# Patient Record
Sex: MALE
[De-identification: names, ages, dates, MRNs, and addresses within clinical notes are randomized per-mention and may not be internally consistent; named-entity substitution may affect disease eponyms.]

---

## 2019-11-06 ENCOUNTER — APPOINTMENT (OUTPATIENT)
Dept: HEART AND VASCULAR | Facility: CLINIC | Age: 59
End: 2019-11-06
Payer: MEDICARE

## 2019-11-06 ENCOUNTER — TRANSCRIPTION ENCOUNTER (OUTPATIENT)
Age: 59
End: 2019-11-06

## 2019-11-06 ENCOUNTER — NON-APPOINTMENT (OUTPATIENT)
Age: 59
End: 2019-11-06

## 2019-11-06 VITALS
DIASTOLIC BLOOD PRESSURE: 104 MMHG | HEIGHT: 70 IN | WEIGHT: 197 LBS | HEART RATE: 82 BPM | SYSTOLIC BLOOD PRESSURE: 154 MMHG | OXYGEN SATURATION: 98 % | BODY MASS INDEX: 28.2 KG/M2

## 2019-11-06 VITALS — HEART RATE: 80 BPM | DIASTOLIC BLOOD PRESSURE: 90 MMHG | SYSTOLIC BLOOD PRESSURE: 140 MMHG

## 2019-11-06 PROBLEM — Z00.00 ENCOUNTER FOR PREVENTIVE HEALTH EXAMINATION: Status: ACTIVE | Noted: 2019-11-06

## 2019-11-06 PROCEDURE — 99214 OFFICE O/P EST MOD 30 MIN: CPT | Mod: 25

## 2019-11-06 PROCEDURE — 93000 ELECTROCARDIOGRAM COMPLETE: CPT

## 2019-11-06 NOTE — HISTORY OF PRESENT ILLNESS
[FreeTextEntry1] : Mr. Jones presents for follow up and management of CAD s/p CABG x 3 (08/01/14). He was referred to my practice for an exercise stress echocardiogram on 07/07/14 as part of his pre-operative evaluation for knee surgery when it was discovered he had an abnormal ECG. He underwent an exercise stress echocardiogram as part of the preoperative work up which revealed an EF of 49%, an old inferolateral MI and inducible inferior ischemia. We discussed having a cardiac evaluation at that time given this new and unexpected finding but he had to travel back to his home country, [place]. In [place], he underwent an ECG only stress test which was interpreted as being normal but he shared the results of my testing with the [place] doctors and he then went on to have an invasive coronary angiogram which revealed total occlusion of his RCA with 3 vessel CAD. He had CABG on 08/01/14 and has recovered well.  He had an exercise stress echocardiogram on 03/09/17 which revealed an EF of 50%, basal inferior/inferolateral akinesis at rest, and no inducible ischemia after 14.8 METS of exercise. He had an exercise stress echocardiogram today 09/04/18 which revealed 09/04/18 an EF of 45%, basal inferior/inferolateral and apical lateral akinesis at rest, no inducible ischemia, 14.1 METS.   He currently lives in [place], [place].  He admits to being off amlodipine for several weeks and to being relatively sedentary but is otherwise well.

## 2019-11-06 NOTE — ASSESSMENT
[FreeTextEntry1] : 1. CAD: s/p silent inferolateral MI, s/p CABG x 3 08/01/14 in [place], CCS 1, s/p 09/04/18: exercise stress echo: EF 45%, basal inferior/inferolateral and apical lateral akinesis at rest, no inducible ischemia, 14.1 METS\par             - continue aggressive medical management \par             - continue ASA 81mg po QD\par             - will send for an exercise stress echocardiogram to r/o inducible ischemia\par  \par 2. S/P CABG (coronary artery bypass graft): CAD, s/p silent inferolateral MI, s/p CABG x 3 08/01/14 in [place] \par  \par 3. Carotid stenosis, bilateral: 11/16/15: sono: b/l 1-19% prox ICA, LECA <50%. \par             - continue medical management\par \par 4. HTN: BP not at ACC/AHA 2017 guideline target\par             - re-initiate amlodipine 10mg po daily\par \par 5. Hyperlipidemia:\par             - continue Crestor 40mg po daily\par             - check lab work today

## 2019-11-06 NOTE — REASON FOR VISIT
[FreeTextEntry1] : Diagnostic Tests\par ----------------------------------\par ECG:\par 11/06/19: NSR, old inferior MI, incomplete RBBB. \par 09/04/18: NSR, possible LATISHA, inferior-posterior MI. \par 03/09/17: NSR, possible inferior MI. \par 11/11/14: NSR, lateral MI\par ------------------------------------\par Stress:\par 09/04/18: exercise stress echo: EF 45%, basal inferior/inferolateral akinesis at rest, no inducible ichemia, 14.1METS\par 03/09/17: exercise stress echo: EF 50%, basal inferior/inferolateral akinesis at rest, no inducible ichemia, 14.8 METS\par 11/16/15: exercise stress echo: EF 50%, basal inferior/inferolateral akinesis at rest, no inducible ichemia, 14. 8 METS.\par 07/07/14: exercise stress echo: EF 49%, lateral MI, inferior ischemia\par ------------------------------------\par Cath:\par 07/30/14: in [place] , report not available, TVD \par -------------------------------------\par Echo:\par 03/06/15: EF 50%, akinesis basal and mid inferolateral walls\par -------------------------------------\par Carotid:\par 11/16/15: sono: b/l 1-19% prox ICA, LECA <50%.

## 2019-11-07 ENCOUNTER — APPOINTMENT (OUTPATIENT)
Dept: HEART AND VASCULAR | Facility: CLINIC | Age: 59
End: 2019-11-07
Payer: MEDICARE

## 2019-11-07 VITALS — HEART RATE: 83 BPM | DIASTOLIC BLOOD PRESSURE: 99 MMHG | SYSTOLIC BLOOD PRESSURE: 161 MMHG

## 2019-11-07 LAB
ALBUMIN SERPL ELPH-MCNC: 4.7 G/DL
ALP BLD-CCNC: 84 U/L
ALT SERPL-CCNC: 35 U/L
ANION GAP SERPL CALC-SCNC: 12 MMOL/L
AST SERPL-CCNC: 23 U/L
BASOPHILS # BLD AUTO: 0.02 K/UL
BASOPHILS NFR BLD AUTO: 0.4 %
BILIRUB SERPL-MCNC: 0.8 MG/DL
BUN SERPL-MCNC: 20 MG/DL
CALCIUM SERPL-MCNC: 9.4 MG/DL
CHLORIDE SERPL-SCNC: 105 MMOL/L
CHOLEST SERPL-MCNC: 282 MG/DL
CHOLEST/HDLC SERPL: 5.4 RATIO
CO2 SERPL-SCNC: 24 MMOL/L
CREAT SERPL-MCNC: 1 MG/DL
EOSINOPHIL # BLD AUTO: 0.06 K/UL
EOSINOPHIL NFR BLD AUTO: 1.1 %
ESTIMATED AVERAGE GLUCOSE: 103 MG/DL
GLUCOSE SERPL-MCNC: 92 MG/DL
HBA1C MFR BLD HPLC: 5.2 %
HCT VFR BLD CALC: 47.4 %
HDLC SERPL-MCNC: 52 MG/DL
HGB BLD-MCNC: 15.9 G/DL
IMM GRANULOCYTES NFR BLD AUTO: 0.4 %
LDLC SERPL CALC-MCNC: 174 MG/DL
LDLC SERPL DIRECT ASSAY-MCNC: 149 MG/DL
LYMPHOCYTES # BLD AUTO: 2.39 K/UL
LYMPHOCYTES NFR BLD AUTO: 44.8 %
MAN DIFF?: NORMAL
MCHC RBC-ENTMCNC: 29.9 PG
MCHC RBC-ENTMCNC: 33.5 GM/DL
MCV RBC AUTO: 89.3 FL
MONOCYTES # BLD AUTO: 0.44 K/UL
MONOCYTES NFR BLD AUTO: 8.2 %
NEUTROPHILS # BLD AUTO: 2.41 K/UL
NEUTROPHILS NFR BLD AUTO: 45.1 %
PLATELET # BLD AUTO: 186 K/UL
POTASSIUM SERPL-SCNC: 4.2 MMOL/L
PROT SERPL-MCNC: 7.7 G/DL
RBC # BLD: 5.31 M/UL
RBC # FLD: 13.5 %
SODIUM SERPL-SCNC: 141 MMOL/L
TRIGL SERPL-MCNC: 281 MG/DL
TSH SERPL-ACNC: 2.09 UIU/ML
WBC # FLD AUTO: 5.34 K/UL

## 2019-11-07 PROCEDURE — 93351 STRESS TTE COMPLETE: CPT

## 2020-11-05 ENCOUNTER — APPOINTMENT (OUTPATIENT)
Dept: HEART AND VASCULAR | Facility: CLINIC | Age: 60
End: 2020-11-05
Payer: MEDICARE

## 2020-11-05 ENCOUNTER — NON-APPOINTMENT (OUTPATIENT)
Age: 60
End: 2020-11-05

## 2020-11-05 VITALS
WEIGHT: 154 LBS | DIASTOLIC BLOOD PRESSURE: 81 MMHG | BODY MASS INDEX: 22.05 KG/M2 | HEART RATE: 99 BPM | HEIGHT: 70 IN | SYSTOLIC BLOOD PRESSURE: 144 MMHG | TEMPERATURE: 98.1 F | OXYGEN SATURATION: 98 %

## 2020-11-05 VITALS — DIASTOLIC BLOOD PRESSURE: 75 MMHG | SYSTOLIC BLOOD PRESSURE: 122 MMHG

## 2020-11-05 PROCEDURE — 93351 STRESS TTE COMPLETE: CPT | Mod: 59

## 2020-11-05 PROCEDURE — 93000 ELECTROCARDIOGRAM COMPLETE: CPT | Mod: 59

## 2020-11-05 PROCEDURE — 99214 OFFICE O/P EST MOD 30 MIN: CPT | Mod: 25

## 2020-11-05 NOTE — REASON FOR VISIT
[FreeTextEntry1] : Diagnostic Tests\par ----------------------------------\par ECG:\par 11/05/20: NSR, old inferior MI, incomplete RBBB. \par 11/06/19: NSR, old inferior MI, incomplete RBBB. \par 09/04/18: NSR, possible LATISHA, inferior-posterior MI. \par 03/09/17: NSR, possible inferior MI. \par 11/11/14: NSR, lateral MI\par ------------------------------------\par Stress:\par 11/05/20: exercise stress echo: EF 48%, aortic sclerosis, basal and mid inferolateral akinesis at rest, no inducible ischemia, 12.8 METS\par 11/07/19: exercise stress echo: EF 49%, basal inferior/inferolateral akinesis at rest, no inducible ischemia, 12.8 METS\par 09/04/18: exercise stress echo: EF 45%, basal inferior/inferolateral akinesis at rest, no inducible ischemia, 14.1 METS\par 03/09/17: exercise stress echo: EF 50%, basal inferior/inferolateral akinesis at rest, no inducible ischemia, 14.8 METS\par 11/16/15: exercise stress echo: EF 50%, basal inferior/inferolateral akinesis at rest, no inducible ischemia, 14. 8 METS.\par 07/07/14: exercise stress echo: EF 49%, lateral MI, inferior ischemia\par ------------------------------------\par Cath:\par 07/30/14: in [place] , report not available, TVD \par -------------------------------------\par Echo:\par 03/06/15: EF 50%, akinesis basal and mid inferolateral walls\par -------------------------------------\par Carotid:\par 11/16/15: sono: b/l 1-19% prox ICA, LECA <50%.

## 2020-11-05 NOTE — ASSESSMENT
[FreeTextEntry1] : 1. CAD: s/p silent inferolateral MI, s/p CABG x 3 08/01/14 in [place], CCS 1, s/p 11/05/20: exercise stress echo: EF 48%, basal inferior/inferolateral and apical lateral akinesis at rest, no inducible ischemia, 14.1 METS\par             - continue aggressive medical management \par             - restart ASA 81mg po QD (discussed with patient importance of lifelong therapy)\par  \par 2. S/P CABG (coronary artery bypass graft): CAD, s/p silent inferolateral MI, s/p CABG x 3 08/01/14 in [place] \par  \par 3. Carotid stenosis, bilateral: 11/16/15: sono: b/l 1-19% prox ICA, LECA <50%. \par             - continue medical management\par \par 4. HTN: BP at ACC/AHA 2017 guideline target\par             - continue off anti-HTN medications at this time\par \par 5. Dyslipidemia: \par             - restart rosuvastatin 40mg po daily (discussed with patient importance of lifelong therapy)\par             - check lab work today

## 2020-11-05 NOTE — HISTORY OF PRESENT ILLNESS
[FreeTextEntry1] : Mr. Jones presents for follow up and management of CAD s/p CABG x 3 (08/01/14). He was referred to my practice for an exercise stress echocardiogram on 07/07/14 as part of his pre-operative evaluation for knee surgery when it was discovered he had an abnormal ECG. He underwent an exercise stress echocardiogram as part of the preoperative work up which revealed an EF of 49%, an old inferolateral MI and inducible inferior ischemia. We discussed having a cardiac evaluation at that time given this new and unexpected finding but he had to travel back to his home country, [place]. In [place], he underwent an ECG only stress test which was interpreted as being normal but he shared the results of my testing with the [place] doctors and he then went on to have an invasive coronary angiogram which revealed total occlusion of his RCA with 3 vessel CAD. He had CABG on 08/01/14 and has recovered well.   He currently lives in [place], [place].  He has been off of his medication since 01/01/2020. He has been feeling well. He also has lost 50 lbs since his last visit through diet and exercise.  Today, 11/05/20 he underwent an exercise stress echocardiogram which revealed an EF of EF 48%, aortic sclerosis, basal and mid inferolateral akinesis at rest, no inducible ischemia, and 12.8 METS

## 2020-11-06 LAB
CHOLEST SERPL-MCNC: 143 MG/DL
HDLC SERPL-MCNC: 55 MG/DL
LDLC SERPL CALC-MCNC: 44 MG/DL
NONHDLC SERPL-MCNC: 87 MG/DL
TRIGL SERPL-MCNC: 216 MG/DL
TSH SERPL-ACNC: 1.27 UIU/ML

## 2020-11-06 RX ORDER — AMLODIPINE BESYLATE 10 MG/1
10 TABLET ORAL DAILY
Qty: 90 | Refills: 3 | Status: DISCONTINUED | COMMUNITY
Start: 1900-01-01 | End: 2020-11-06

## 2020-11-10 LAB
ALBUMIN SERPL ELPH-MCNC: 4.4 G/DL
ALP BLD-CCNC: 75 U/L
ALT SERPL-CCNC: 28 U/L
ANION GAP SERPL CALC-SCNC: 14 MMOL/L
AST SERPL-CCNC: 22 U/L
BASOPHILS # BLD AUTO: 0.02 K/UL
BASOPHILS NFR BLD AUTO: 0.4 %
BILIRUB SERPL-MCNC: 1.1 MG/DL
BUN SERPL-MCNC: 17 MG/DL
CALCIUM SERPL-MCNC: 9.6 MG/DL
CHLORIDE SERPL-SCNC: 106 MMOL/L
CO2 SERPL-SCNC: 26 MMOL/L
CREAT SERPL-MCNC: 0.97 MG/DL
EOSINOPHIL # BLD AUTO: 0.05 K/UL
EOSINOPHIL NFR BLD AUTO: 1 %
ESTIMATED AVERAGE GLUCOSE: 97 MG/DL
GLUCOSE SERPL-MCNC: 104 MG/DL
HBA1C MFR BLD HPLC: 5 %
HCT VFR BLD CALC: 43.3 %
HGB BLD-MCNC: 13.9 G/DL
IMM GRANULOCYTES NFR BLD AUTO: 0.2 %
LDLC SERPL DIRECT ASSAY-MCNC: 50 MG/DL
LYMPHOCYTES # BLD AUTO: 1.84 K/UL
LYMPHOCYTES NFR BLD AUTO: 37.7 %
MAN DIFF?: NORMAL
MCHC RBC-ENTMCNC: 30 PG
MCHC RBC-ENTMCNC: 32.1 GM/DL
MCV RBC AUTO: 93.3 FL
MONOCYTES # BLD AUTO: 0.39 K/UL
MONOCYTES NFR BLD AUTO: 8 %
NEUTROPHILS # BLD AUTO: 2.57 K/UL
NEUTROPHILS NFR BLD AUTO: 52.7 %
PLATELET # BLD AUTO: 166 K/UL
POTASSIUM SERPL-SCNC: 4.2 MMOL/L
PROT SERPL-MCNC: 7.3 G/DL
RBC # BLD: 4.64 M/UL
RBC # FLD: 13.8 %
SODIUM SERPL-SCNC: 146 MMOL/L
WBC # FLD AUTO: 4.88 K/UL

## 2021-04-19 ENCOUNTER — NON-APPOINTMENT (OUTPATIENT)
Age: 61
End: 2021-04-19

## 2021-04-19 ENCOUNTER — APPOINTMENT (OUTPATIENT)
Dept: HEART AND VASCULAR | Facility: CLINIC | Age: 61
End: 2021-04-19
Payer: MEDICARE

## 2021-04-19 VITALS
WEIGHT: 165 LBS | BODY MASS INDEX: 23.62 KG/M2 | HEART RATE: 78 BPM | DIASTOLIC BLOOD PRESSURE: 87 MMHG | SYSTOLIC BLOOD PRESSURE: 160 MMHG | HEIGHT: 70 IN | OXYGEN SATURATION: 98 %

## 2021-04-19 VITALS — SYSTOLIC BLOOD PRESSURE: 152 MMHG | DIASTOLIC BLOOD PRESSURE: 90 MMHG

## 2021-04-19 VITALS — DIASTOLIC BLOOD PRESSURE: 91 MMHG | SYSTOLIC BLOOD PRESSURE: 159 MMHG

## 2021-04-19 PROCEDURE — 99215 OFFICE O/P EST HI 40 MIN: CPT | Mod: 25

## 2021-04-19 PROCEDURE — 93000 ELECTROCARDIOGRAM COMPLETE: CPT

## 2021-04-19 NOTE — REASON FOR VISIT
[Follow-Up - Clinic] : a clinic follow-up of [FreeTextEntry1] : Diagnostic Tests\par ----------------------------------\par ECG:\par 04/19/21: NSR, old inferior MI, incomplete RBBB, LATISHA. \par 11/05/20: NSR, old inferior MI, incomplete RBBB. \par 11/06/19: NSR, old inferior MI, incomplete RBBB. \par 09/04/18: NSR, possible LATISHA, inferior-posterior MI. \par 03/09/17: NSR, possible inferior MI. \par 11/11/14: NSR, lateral MI\par ------------------------------------\par Stress:\par 11/05/20: exercise stress echo: EF 48%, aortic sclerosis, basal and mid inferolateral akinesis at rest, no inducible ischemia, 12.8 METS\par 11/07/19: exercise stress echo: EF 49%, basal inferior/inferolateral akinesis at rest, no inducible ischemia, 12.8 METS\par 09/04/18: exercise stress echo: EF 45%, basal inferior/inferolateral akinesis at rest, no inducible ischemia, 14.1 METS\par 03/09/17: exercise stress echo: EF 50%, basal inferior/inferolateral akinesis at rest, no inducible ischemia, 14.8 METS\par 11/16/15: exercise stress echo: EF 50%, basal inferior/inferolateral akinesis at rest, no inducible ischemia, 14. 8 METS.\par 07/07/14: exercise stress echo: EF 49%, lateral MI, inferior ischemia\par ------------------------------------\par Cath:\par 07/30/14: in [place] , report not available, TVD \par -------------------------------------\par Echo:\par 03/06/15: EF 50%, akinesis basal and mid inferolateral walls\par -------------------------------------\par Carotid:\par 11/16/15: sono: b/l 1-19% prox ICA, LECA <50%.

## 2021-04-19 NOTE — HISTORY OF PRESENT ILLNESS
[FreeTextEntry1] : Mr. Jones presents for follow up and management of CAD s/p CABG x 3 (08/01/14). He was referred to my practice for an exercise stress echocardiogram on 07/07/14 as part of his pre-operative evaluation for knee surgery when it was discovered he had an abnormal ECG. He underwent an exercise stress echocardiogram as part of the preoperative work up which revealed an EF of 49%, an old inferolateral MI and inducible inferior ischemia. We discussed having a cardiac evaluation at that time given this new and unexpected finding but he had to travel back to his home country, [place]. In [place], he underwent an ECG only stress test which was interpreted as being normal but he shared the results of my testing with the [place] doctors and he then went on to have an invasive coronary angiogram which revealed total occlusion of his RCA with 3 vessel CAD. He had CABG on 08/01/14 and has recovered well.   He currently lives in [place], [place].  He has been off of his medication since 01/01/2020. He has been feeling well. He also has lost 50 lbs since his last visit through diet and exercise.  Today, 11/05/20 he underwent an exercise stress echocardiogram which revealed an EF of EF 48%, aortic sclerosis, basal and mid inferolateral akinesis at rest, no inducible ischemia, and 12.8 METS.  His daughter gave birth to a baby girl (his first grandchild).  He denies chest pain and has ROCA to 2 flights of stairs.

## 2021-04-19 NOTE — PHYSICAL EXAM
[General Appearance - Well Developed] : well developed [Normal Appearance] : normal appearance [Well Groomed] : well groomed [General Appearance - Well Nourished] : well nourished [No Deformities] : no deformities [General Appearance - In No Acute Distress] : no acute distress [Normal Conjunctiva] : the conjunctiva exhibited no abnormalities [Eyelids - No Xanthelasma] : the eyelids demonstrated no xanthelasmas [Normal Oral Mucosa] : normal oral mucosa [No Oral Pallor] : no oral pallor [No Oral Cyanosis] : no oral cyanosis [Normal Jugular Venous A Waves Present] : normal jugular venous A waves present [Normal Jugular Venous V Waves Present] : normal jugular venous V waves present [No Jugular Venous Sherwood A Waves] : no jugular venous sherwood A waves [Respiration, Rhythm And Depth] : normal respiratory rhythm and effort [Exaggerated Use Of Accessory Muscles For Inspiration] : no accessory muscle use [Auscultation Breath Sounds / Voice Sounds] : lungs were clear to auscultation bilaterally [Heart Sounds] : normal S1 and S2 [Heart Rate And Rhythm] : heart rate and rhythm were normal [Murmurs] : no murmurs present [Abdomen Soft] : soft [Abdomen Tenderness] : non-tender [Abdomen Mass (___ Cm)] : no abdominal mass palpated [Abnormal Walk] : normal gait [Gait - Sufficient For Exercise Testing] : the gait was sufficient for exercise testing [Cyanosis, Localized] : no localized cyanosis [Nail Clubbing] : no clubbing of the fingernails [Petechial Hemorrhages (___cm)] : no petechial hemorrhages [Skin Color & Pigmentation] : normal skin color and pigmentation [] : no rash [No Venous Stasis] : no venous stasis [Skin Lesions] : no skin lesions [No Skin Ulcers] : no skin ulcer [No Xanthoma] : no  xanthoma was observed [Oriented To Time, Place, And Person] : oriented to person, place, and time [Affect] : the affect was normal [Mood] : the mood was normal [No Anxiety] : not feeling anxious [FreeTextEntry1] : + right GSV harvest scar

## 2021-04-19 NOTE — ASSESSMENT
[FreeTextEntry1] : 1. CAD: s/p silent inferolateral MI, s/p CABG x 3 08/01/14 in [place], CCS 1, s/p 11/05/20: exercise stress echo: EF 48%, basal inferior/inferolateral and apical lateral akinesis at rest, no inducible ischemia, 14.1 METS\par             - continue aggressive medical management \par             - continue ASA 81mg po QD (discussed with patient importance of lifelong therapy)\par \par 2. S/P CABG (coronary artery bypass graft): CAD, s/p silent inferolateral MI, s/p CABG x 3 08/01/14 in [place] \par  \par 3. Carotid stenosis, bilateral: 11/16/15: sono: b/l 1-19% prox ICA, LECA <50%. \par             - continue medical management\par \par 4. HTN: BP not at ACC/AHA 2017 guideline target\par             - will initiate valsartan 80mg po qd (possible adverse effects of new medications discussed) \par             - if BP remains above target next visit will up titrate anti-HTN regimen \par \par 5. Dyslipidemia: lipids optimal \par             - continue rosuvastatin 20mg po qd

## 2022-01-12 ENCOUNTER — APPOINTMENT (OUTPATIENT)
Dept: HEART AND VASCULAR | Facility: CLINIC | Age: 62
End: 2022-01-12
Payer: MEDICARE

## 2022-01-12 PROCEDURE — 93351 STRESS TTE COMPLETE: CPT

## 2022-01-13 ENCOUNTER — NON-APPOINTMENT (OUTPATIENT)
Age: 62
End: 2022-01-13

## 2022-01-13 ENCOUNTER — APPOINTMENT (OUTPATIENT)
Dept: HEART AND VASCULAR | Facility: CLINIC | Age: 62
End: 2022-01-13
Payer: MEDICARE

## 2022-01-13 VITALS
DIASTOLIC BLOOD PRESSURE: 78 MMHG | WEIGHT: 175 LBS | HEART RATE: 85 BPM | HEIGHT: 70 IN | SYSTOLIC BLOOD PRESSURE: 135 MMHG | OXYGEN SATURATION: 98 % | BODY MASS INDEX: 25.05 KG/M2 | TEMPERATURE: 97.3 F

## 2022-01-13 VITALS — SYSTOLIC BLOOD PRESSURE: 128 MMHG | DIASTOLIC BLOOD PRESSURE: 92 MMHG

## 2022-01-13 PROCEDURE — 99214 OFFICE O/P EST MOD 30 MIN: CPT | Mod: 25

## 2022-01-13 PROCEDURE — 93000 ELECTROCARDIOGRAM COMPLETE: CPT

## 2022-01-13 NOTE — REASON FOR VISIT
[Other: ____] : [unfilled] [FreeTextEntry1] : Diagnostic Tests\par ----------------------------------\par ECG:\par 01/13/22: NSR, old inferior MI, incomplete RBBB, LATISHA. \par 04/19/21: NSR, old inferior MI, incomplete RBBB, LATISHA. \par 11/05/20: NSR, old inferior MI, incomplete RBBB. \par 11/06/19: NSR, old inferior MI, incomplete RBBB. \par 09/04/18: NSR, possible LATISHA, inferior-posterior MI. \par 03/09/17: NSR, possible inferior MI. \par 11/11/14: NSR, lateral MI\par ------------------------------------\par Stress:\par 01/12/22: exercise stress echo: EF 45%, aortic sclerosis, dilated aortic root 3.7 cm, basal and mid inferolateral and basal inferior akinesis at rest, no inducible ischemia post 12.3 METS.\par 11/05/20: exercise stress echo: EF 48%, aortic sclerosis, basal and mid inferolateral akinesis at rest, no inducible ischemia, 12.8 METS\par 11/07/19: exercise stress echo: EF 49%, basal inferior/inferolateral akinesis at rest, no inducible ischemia, 12.8 METS\par 09/04/18: exercise stress echo: EF 45%, basal inferior/inferolateral akinesis at rest, no inducible ischemia, 14.1 METS\par 03/09/17: exercise stress echo: EF 50%, basal inferior/inferolateral akinesis at rest, no inducible ischemia, 14.8 METS\par 11/16/15: exercise stress echo: EF 50%, basal inferior/inferolateral akinesis at rest, no inducible ischemia, 14. 8 METS.\par 07/07/14: exercise stress echo: EF 49%, lateral MI, inferior ischemia\par ------------------------------------\par Cath:\par 07/30/14: in [place] , report not available, TVD \par -------------------------------------\par Echo:\par 03/06/15: EF 50%, akinesis basal and mid inferolateral walls\par -------------------------------------\par Carotid:\par 11/16/15: sono: b/l 1-19% prox ICA, LECA <50%.

## 2022-01-13 NOTE — HISTORY OF PRESENT ILLNESS
[FreeTextEntry1] : Mr. Jones presents for follow up and management of CAD s/p CABG x 3 (08/01/14). He was referred to my practice for an exercise stress echocardiogram on 07/07/14 as part of his pre-operative evaluation for knee surgery when it was discovered he had an abnormal ECG. He underwent an exercise stress echocardiogram as part of the preoperative work up which revealed an EF of 49%, an old inferolateral MI and inducible inferior ischemia. We discussed having a cardiac evaluation at that time given this new and unexpected finding but he had to travel back to his home country, [place]. In [place], he underwent an ECG only stress test which was interpreted as being normal but he shared the results of my testing with the [place] doctors and he then went on to have an invasive coronary angiogram which revealed total occlusion of his RCA with 3 vessel CAD. He had CABG on 08/01/14 and has recovered well.   He currently lives in [place], [place].  He has been off of his medication since 01/01/2020. He has been feeling well. He also has lost 50 lbs since his last visit through diet and exercise.  On 01/12/22 he had an exercise stress echocardiogram which revealed an EF of 45%, aortic sclerosis, dilated aortic root 3.7 cm, and basal and mid inferolateral and basal inferior akinesis at rest, no inducible ischemia post 12.3 METS. His daughter gave birth to a baby girl (his first grandchild).  He denies chest pain and has ROCA to 2 flights of stairs.

## 2022-01-14 LAB
ALBUMIN SERPL ELPH-MCNC: 4.9 G/DL
ALP BLD-CCNC: 77 U/L
ALT SERPL-CCNC: 36 U/L
ANION GAP SERPL CALC-SCNC: 15 MMOL/L
AST SERPL-CCNC: 30 U/L
BASOPHILS # BLD AUTO: 0.02 K/UL
BASOPHILS NFR BLD AUTO: 0.4 %
BILIRUB SERPL-MCNC: 0.7 MG/DL
BUN SERPL-MCNC: 17 MG/DL
CALCIUM SERPL-MCNC: 9.3 MG/DL
CHLORIDE SERPL-SCNC: 104 MMOL/L
CHOLEST SERPL-MCNC: 143 MG/DL
CO2 SERPL-SCNC: 25 MMOL/L
CREAT SERPL-MCNC: 1.01 MG/DL
EOSINOPHIL # BLD AUTO: 0.05 K/UL
EOSINOPHIL NFR BLD AUTO: 1.1 %
ESTIMATED AVERAGE GLUCOSE: 108 MG/DL
GLUCOSE SERPL-MCNC: 85 MG/DL
HBA1C MFR BLD HPLC: 5.4 %
HCT VFR BLD CALC: 47.7 %
HDLC SERPL-MCNC: 74 MG/DL
HGB BLD-MCNC: 15 G/DL
IMM GRANULOCYTES NFR BLD AUTO: 0.4 %
LDLC SERPL CALC-MCNC: 47 MG/DL
LDLC SERPL DIRECT ASSAY-MCNC: 39 MG/DL
LYMPHOCYTES # BLD AUTO: 1.94 K/UL
LYMPHOCYTES NFR BLD AUTO: 41.4 %
MAN DIFF?: NORMAL
MCHC RBC-ENTMCNC: 29.8 PG
MCHC RBC-ENTMCNC: 31.4 GM/DL
MCV RBC AUTO: 94.8 FL
MONOCYTES # BLD AUTO: 0.4 K/UL
MONOCYTES NFR BLD AUTO: 8.5 %
NEUTROPHILS # BLD AUTO: 2.26 K/UL
NEUTROPHILS NFR BLD AUTO: 48.2 %
NONHDLC SERPL-MCNC: 70 MG/DL
PLATELET # BLD AUTO: 153 K/UL
POTASSIUM SERPL-SCNC: 4.1 MMOL/L
PROT SERPL-MCNC: 7.6 G/DL
RBC # BLD: 5.03 M/UL
RBC # FLD: 14.3 %
SODIUM SERPL-SCNC: 145 MMOL/L
TRIGL SERPL-MCNC: 111 MG/DL
TSH SERPL-ACNC: 1.48 UIU/ML
WBC # FLD AUTO: 4.69 K/UL

## 2022-05-17 ENCOUNTER — RX RENEWAL (OUTPATIENT)
Age: 62
End: 2022-05-17

## 2022-09-09 ENCOUNTER — RX RENEWAL (OUTPATIENT)
Age: 62
End: 2022-09-09

## 2022-12-19 ENCOUNTER — APPOINTMENT (OUTPATIENT)
Dept: HEART AND VASCULAR | Facility: CLINIC | Age: 62
End: 2022-12-19
Payer: MEDICARE

## 2022-12-19 ENCOUNTER — NON-APPOINTMENT (OUTPATIENT)
Age: 62
End: 2022-12-19

## 2022-12-19 VITALS
HEART RATE: 77 BPM | SYSTOLIC BLOOD PRESSURE: 154 MMHG | HEIGHT: 70 IN | OXYGEN SATURATION: 98 % | WEIGHT: 185 LBS | BODY MASS INDEX: 26.48 KG/M2 | DIASTOLIC BLOOD PRESSURE: 83 MMHG

## 2022-12-19 VITALS — DIASTOLIC BLOOD PRESSURE: 91 MMHG | SYSTOLIC BLOOD PRESSURE: 143 MMHG

## 2022-12-19 PROCEDURE — 99214 OFFICE O/P EST MOD 30 MIN: CPT | Mod: 25

## 2022-12-19 PROCEDURE — 93000 ELECTROCARDIOGRAM COMPLETE: CPT

## 2022-12-19 NOTE — ASSESSMENT
[FreeTextEntry1] : 1. CAD: s/p silent inferolateral MI, s/p CABG x 3 08/01/14 in [place], CCS 1, s/p 11/05/20: exercise stress echo: EF 48%, basal inferior/inferolateral and apical lateral akinesis at rest, no inducible ischemia, 14.1 METS\par             - continue aggressive medical management \par             - continue ASA 81mg po qd \par \par 2. S/P CABG (coronary artery bypass graft): CAD, s/p silent inferolateral MI, s/p CABG x 3 08/01/14 in [place] \par             - continue aggressive medical management \par  \par 3. Carotid stenosis, bilateral: 11/16/15: sono: b/l 1-19% prox ICA, LECA <50%. \par             - continue medical management\par \par 4. HTN: BP not at ACC/AHA 2017 guideline target: off valsartan for several weeks:\par             - continue valsartan 80mg po qd \par             - if BP remains above target next visit will up titrate anti-HTN regimen \par \par 5. Dyslipidemia: lipids optimal \par             - continue rosuvastatin 20mg po qd\par             - check lab work today \par \par

## 2022-12-19 NOTE — REASON FOR VISIT
[Other: ____] : [unfilled] [FreeTextEntry1] : Diagnostic Tests\par ----------------------------------\par ECG:\par 12/19/22: sinus rhythm, old inferior MI, incomplete RBBB. \par 01/13/22: NSR, old inferior MI, incomplete RBBB, LATISHA. \par 04/19/21: NSR, old inferior MI, incomplete RBBB, LATISHA. \par 11/05/20: NSR, old inferior MI, incomplete RBBB. \par 11/06/19: NSR, old inferior MI, incomplete RBBB. \par 09/04/18: NSR, possible LATISHA, inferior-posterior MI. \par 03/09/17: NSR, possible inferior MI. \par 11/11/14: NSR, lateral MI\par ------------------------------------\par Stress:\par 01/12/22: exercise stress echo: EF 45%, aortic sclerosis, dilated aortic root 3.7 cm, basal and mid inferolateral and basal inferior akinesis at rest, no inducible ischemia post 12.3 METS.\par 11/05/20: exercise stress echo: EF 48%, aortic sclerosis, basal and mid inferolateral akinesis at rest, no inducible ischemia, 12.8 METS\par 11/07/19: exercise stress echo: EF 49%, basal inferior/inferolateral akinesis at rest, no inducible ischemia, 12.8 METS\par 09/04/18: exercise stress echo: EF 45%, basal inferior/inferolateral akinesis at rest, no inducible ischemia, 14.1 METS\par 03/09/17: exercise stress echo: EF 50%, basal inferior/inferolateral akinesis at rest, no inducible ischemia, 14.8 METS\par 11/16/15: exercise stress echo: EF 50%, basal inferior/inferolateral akinesis at rest, no inducible ischemia, 14. 8 METS.\par 07/07/14: exercise stress echo: EF 49%, lateral MI, inferior ischemia\par ------------------------------------\par Cath:\par 07/30/14: in [place] , report not available, TVD \par -------------------------------------\par Echo:\par 03/06/15: EF 50%, akinesis basal and mid inferolateral walls\par -------------------------------------\par Carotid:\par 11/16/15: sono: b/l 1-19% prox ICA, LECA <50%.

## 2022-12-19 NOTE — HISTORY OF PRESENT ILLNESS
[FreeTextEntry1] : Mr. Jones presents for follow up and management of CAD s/p CABG x 3 (08/01/14). He was referred to my practice for an exercise stress echocardiogram on 07/07/14 as part of his pre-operative evaluation for knee surgery when it was discovered he had an abnormal ECG. He underwent an exercise stress echocardiogram as part of the preoperative work up which revealed an EF of 49%, an old inferolateral MI and inducible inferior ischemia. We discussed having a cardiac evaluation at that time given this new and unexpected finding but he had to travel back to his home country, [place]. In [place], he underwent an ECG only stress test which was interpreted as being normal but he shared the results of my testing with the [place] doctors and he then went on to have an invasive coronary angiogram which revealed total occlusion of his RCA with 3 vessel CAD. He had CABG on 08/01/14 and has recovered well.  He currently lives in [place], [place]. On 01/12/22 he had an exercise stress echocardiogram which revealed an EF of 45%, aortic sclerosis, dilated aortic root 3.7 cm, and basal and mid inferolateral and basal inferior akinesis at rest, no inducible ischemia post 12.3 METS.  At present, he denies chest pain and has ROCA to 2 flights of stairs.  He admits to being off valsartan for the past several weeks.

## 2022-12-19 NOTE — PHYSICAL EXAM
[General Appearance - Well Developed] : well developed [Normal Appearance] : normal appearance [Well Groomed] : well groomed [General Appearance - Well Nourished] : well nourished [No Deformities] : no deformities [General Appearance - In No Acute Distress] : no acute distress [Normal Conjunctiva] : the conjunctiva exhibited no abnormalities [Eyelids - No Xanthelasma] : the eyelids demonstrated no xanthelasmas [Normal Oral Mucosa] : normal oral mucosa [No Oral Pallor] : no oral pallor [No Oral Cyanosis] : no oral cyanosis [Normal Jugular Venous A Waves Present] : normal jugular venous A waves present [Normal Jugular Venous V Waves Present] : normal jugular venous V waves present [No Jugular Venous Sherwood A Waves] : no jugular venous sherwood A waves [Respiration, Rhythm And Depth] : normal respiratory rhythm and effort [Exaggerated Use Of Accessory Muscles For Inspiration] : no accessory muscle use [Auscultation Breath Sounds / Voice Sounds] : lungs were clear to auscultation bilaterally [Heart Rate And Rhythm] : heart rate and rhythm were normal [Heart Sounds] : normal S1 and S2 [Murmurs] : no murmurs present [Abdomen Soft] : soft [Abdomen Tenderness] : non-tender [Abdomen Mass (___ Cm)] : no abdominal mass palpated [Abnormal Walk] : normal gait [Gait - Sufficient For Exercise Testing] : the gait was sufficient for exercise testing [Nail Clubbing] : no clubbing of the fingernails [Cyanosis, Localized] : no localized cyanosis [Petechial Hemorrhages (___cm)] : no petechial hemorrhages [Skin Color & Pigmentation] : normal skin color and pigmentation [] : no rash [No Venous Stasis] : no venous stasis [Skin Lesions] : no skin lesions [No Skin Ulcers] : no skin ulcer [No Xanthoma] : no  xanthoma was observed [Oriented To Time, Place, And Person] : oriented to person, place, and time [Affect] : the affect was normal [Mood] : the mood was normal [No Anxiety] : not feeling anxious [FreeTextEntry1] : + right GSV harvest scar

## 2022-12-20 LAB
ALBUMIN SERPL ELPH-MCNC: 4.5 G/DL
ALP BLD-CCNC: 70 U/L
ALT SERPL-CCNC: 26 U/L
ANION GAP SERPL CALC-SCNC: 12 MMOL/L
AST SERPL-CCNC: 19 U/L
BASOPHILS # BLD AUTO: 0.01 K/UL
BASOPHILS NFR BLD AUTO: 0.2 %
BILIRUB SERPL-MCNC: 0.7 MG/DL
BUN SERPL-MCNC: 23 MG/DL
CALCIUM SERPL-MCNC: 9.5 MG/DL
CHLORIDE SERPL-SCNC: 104 MMOL/L
CHOLEST SERPL-MCNC: 197 MG/DL
CO2 SERPL-SCNC: 27 MMOL/L
CREAT SERPL-MCNC: 0.99 MG/DL
EGFR: 87 ML/MIN/1.73M2
EOSINOPHIL # BLD AUTO: 0.08 K/UL
EOSINOPHIL NFR BLD AUTO: 1.4 %
ESTIMATED AVERAGE GLUCOSE: 111 MG/DL
GLUCOSE SERPL-MCNC: 88 MG/DL
HBA1C MFR BLD HPLC: 5.5 %
HCT VFR BLD CALC: 45.6 %
HDLC SERPL-MCNC: 52 MG/DL
HGB BLD-MCNC: 14.8 G/DL
IMM GRANULOCYTES NFR BLD AUTO: 0.5 %
LDLC SERPL DIRECT ASSAY-MCNC: 74 MG/DL
LYMPHOCYTES # BLD AUTO: 2.07 K/UL
LYMPHOCYTES NFR BLD AUTO: 35.1 %
MAN DIFF?: NORMAL
MCHC RBC-ENTMCNC: 29.5 PG
MCHC RBC-ENTMCNC: 32.5 GM/DL
MCV RBC AUTO: 91 FL
MONOCYTES # BLD AUTO: 0.47 K/UL
MONOCYTES NFR BLD AUTO: 8 %
NEUTROPHILS # BLD AUTO: 3.23 K/UL
NEUTROPHILS NFR BLD AUTO: 54.8 %
PLATELET # BLD AUTO: 221 K/UL
POTASSIUM SERPL-SCNC: 4.2 MMOL/L
PROT SERPL-MCNC: 7.3 G/DL
RBC # BLD: 5.01 M/UL
RBC # FLD: 13.4 %
SODIUM SERPL-SCNC: 143 MMOL/L
TRIGL SERPL-MCNC: 271 MG/DL
TSH SERPL-ACNC: 1.05 UIU/ML
WBC # FLD AUTO: 5.89 K/UL

## 2023-04-05 ENCOUNTER — RX RENEWAL (OUTPATIENT)
Age: 63
End: 2023-04-05

## 2023-04-25 ENCOUNTER — APPOINTMENT (OUTPATIENT)
Dept: HEART AND VASCULAR | Facility: CLINIC | Age: 63
End: 2023-04-25

## 2023-07-06 ENCOUNTER — APPOINTMENT (OUTPATIENT)
Dept: HEART AND VASCULAR | Facility: CLINIC | Age: 63
End: 2023-07-06
Payer: MEDICARE

## 2023-07-06 ENCOUNTER — NON-APPOINTMENT (OUTPATIENT)
Age: 63
End: 2023-07-06

## 2023-07-06 PROCEDURE — 93880 EXTRACRANIAL BILAT STUDY: CPT

## 2023-07-06 PROCEDURE — 93306 TTE W/DOPPLER COMPLETE: CPT

## 2023-07-11 ENCOUNTER — NON-APPOINTMENT (OUTPATIENT)
Age: 63
End: 2023-07-11

## 2023-07-11 ENCOUNTER — APPOINTMENT (OUTPATIENT)
Dept: HEART AND VASCULAR | Facility: CLINIC | Age: 63
End: 2023-07-11
Payer: MEDICARE

## 2023-07-11 VITALS — SYSTOLIC BLOOD PRESSURE: 152 MMHG | DIASTOLIC BLOOD PRESSURE: 93 MMHG

## 2023-07-11 VITALS
HEIGHT: 70 IN | HEART RATE: 69 BPM | WEIGHT: 315 LBS | OXYGEN SATURATION: 98 % | DIASTOLIC BLOOD PRESSURE: 76 MMHG | BODY MASS INDEX: 45.1 KG/M2 | SYSTOLIC BLOOD PRESSURE: 137 MMHG

## 2023-07-11 PROCEDURE — 99214 OFFICE O/P EST MOD 30 MIN: CPT | Mod: 25

## 2023-07-11 PROCEDURE — 93000 ELECTROCARDIOGRAM COMPLETE: CPT

## 2023-07-11 NOTE — REASON FOR VISIT
[FreeTextEntry1] : Diagnostic Tests\par ----------------------------------\par ECG:\par 07/11/23: sinus rhythm, frequent PVCs, old inferior MI. \par 12/19/22: sinus rhythm, old inferior MI, incomplete RBBB. \par 01/13/22: NSR, old inferior MI, incomplete RBBB, LATISHA. \par 04/19/21: NSR, old inferior MI, incomplete RBBB, LATISHA. \par 11/05/20: NSR, old inferior MI, incomplete RBBB. \par 11/06/19: NSR, old inferior MI, incomplete RBBB. \par 09/04/18: NSR, possible LATISHA, inferior-posterior MI. \par 03/09/17: NSR, possible inferior MI. \par 11/11/14: NSR, lateral MI\par ------------------------------------\par Stress:\par 01/12/22: exercise stress echo: EF 45%, aortic sclerosis, dilated aortic root 3.7 cm, basal and mid inferolateral and basal inferior akinesis at rest, no inducible ischemia post 12.3 METS.\par 11/05/20: exercise stress echo: EF 48%, aortic sclerosis, basal and mid inferolateral akinesis at rest, no inducible ischemia, 12.8 METS\par 11/07/19: exercise stress echo: EF 49%, basal inferior/inferolateral akinesis at rest, no inducible ischemia, 12.8 METS\par 09/04/18: exercise stress echo: EF 45%, basal inferior/inferolateral akinesis at rest, no inducible ischemia, 14.1 METS\par 03/09/17: exercise stress echo: EF 50%, basal inferior/inferolateral akinesis at rest, no inducible ischemia, 14.8 METS\par 11/16/15: exercise stress echo: EF 50%, basal inferior/inferolateral akinesis at rest, no inducible ischemia, 14. 8 METS.\par 07/07/14: exercise stress echo: EF 49%, lateral MI, inferior ischemia\par ------------------------------------\par Cath:\par 07/30/14: in [place] , report not available, TVD \par -------------------------------------\par Echo:\par 07/06/23: EF 51%, akinesis basal inferior wall and basal and mid inferolateral walls, aortic sclerosis. \par 03/06/15: EF 50%, akinesis basal and mid inferolateral walls\par -------------------------------------\par Carotid:\par 07/06/23: sono: b/l prox ICA 1-19%. \par 11/16/15: sono: b/l 1-19% prox ICA, LECA <50%.

## 2023-07-11 NOTE — HISTORY OF PRESENT ILLNESS
[FreeTextEntry1] : Mr. Jones presents for follow up and management of CAD s/p CABG x 3 (08/01/14). He was referred to my practice for an exercise stress echocardiogram on 07/07/14 as part of his pre-operative evaluation for knee surgery when it was discovered he had an abnormal ECG. He underwent an exercise stress echocardiogram as part of the preoperative work up which revealed an EF of 49%, an old inferolateral MI and inducible inferior ischemia. We discussed having a cardiac evaluation at that time given this new and unexpected finding but he had to travel back to his home country, [place]. In [place], he underwent an ECG only stress test which was interpreted as being normal but he shared the results of my testing with the [place] doctors and he then went on to have an invasive coronary angiogram which revealed total occlusion of his RCA with 3 vessel CAD. He had CABG on 08/01/14 and has recovered well.  He currently lives in [place], [place]. On 01/12/22, he had an exercise stress echocardiogram which revealed an EF of 45%, aortic sclerosis, dilated aortic root 3.7 cm, and basal and mid inferolateral and basal inferior akinesis at rest, no inducible ischemia post 12.3 METS.  On 07/06/23, he had an echocardiogram which revealed an EF of 51%, akinesis basal inferior wall and basal and mid inferolateral walls, and aortic sclerosis.  About 2-3 weeks ago (June, 2023), he had describes an episode of syncope. He was asleep in the cabin of his boat cabin and he woke up with complaints of ROCA.  He went upstairs to the deck and had a syncopal episode.  When he awoke, he felt well except for a small bruise on right hip.  At present, he feels back to normal and has not had a recurrence.  \par \par

## 2023-07-12 LAB
ALBUMIN SERPL ELPH-MCNC: 4.7 G/DL
ALP BLD-CCNC: 76 U/L
ALT SERPL-CCNC: 42 U/L
ANION GAP SERPL CALC-SCNC: 12 MMOL/L
AST SERPL-CCNC: 33 U/L
BILIRUB SERPL-MCNC: 0.8 MG/DL
BUN SERPL-MCNC: 20 MG/DL
CALCIUM SERPL-MCNC: 9.4 MG/DL
CHLORIDE SERPL-SCNC: 105 MMOL/L
CHOLEST SERPL-MCNC: 139 MG/DL
CO2 SERPL-SCNC: 26 MMOL/L
CREAT SERPL-MCNC: 1.13 MG/DL
EGFR: 74 ML/MIN/1.73M2
ESTIMATED AVERAGE GLUCOSE: 111 MG/DL
GLUCOSE SERPL-MCNC: 98 MG/DL
HBA1C MFR BLD HPLC: 5.5 %
HDLC SERPL-MCNC: 75 MG/DL
LDLC SERPL DIRECT ASSAY-MCNC: 37 MG/DL
POTASSIUM SERPL-SCNC: 4.1 MMOL/L
PROT SERPL-MCNC: 7.9 G/DL
SODIUM SERPL-SCNC: 143 MMOL/L
TRIGL SERPL-MCNC: 131 MG/DL
TSH SERPL-ACNC: 2.35 UIU/ML

## 2023-07-13 LAB — APO LP(A) SERPL-MCNC: <9 NMOL/L

## 2023-08-15 ENCOUNTER — RX RENEWAL (OUTPATIENT)
Age: 63
End: 2023-08-15

## 2023-08-21 ENCOUNTER — NON-APPOINTMENT (OUTPATIENT)
Age: 63
End: 2023-08-21

## 2023-09-20 ENCOUNTER — APPOINTMENT (OUTPATIENT)
Dept: SPINE | Facility: CLINIC | Age: 63
End: 2023-09-20

## 2023-09-20 ENCOUNTER — APPOINTMENT (OUTPATIENT)
Dept: SPINE | Facility: CLINIC | Age: 63
End: 2023-09-20
Payer: MEDICARE

## 2023-09-20 DIAGNOSIS — M48.062 SPINAL STENOSIS, LUMBAR REGION WITH NEUROGENIC CLAUDICATION: ICD-10-CM

## 2023-09-20 DIAGNOSIS — I25.2 OLD MYOCARDIAL INFARCTION: ICD-10-CM

## 2023-09-20 DIAGNOSIS — M25.511 PAIN IN RIGHT SHOULDER: ICD-10-CM

## 2023-09-20 DIAGNOSIS — M50.20 OTHER CERVICAL DISC DISPLACEMENT, UNSPECIFIED CERVICAL REGION: ICD-10-CM

## 2023-09-20 DIAGNOSIS — Z82.49 FAMILY HISTORY OF ISCHEMIC HEART DISEASE AND OTHER DISEASES OF THE CIRCULATORY SYSTEM: ICD-10-CM

## 2023-09-20 PROCEDURE — 99203 OFFICE O/P NEW LOW 30 MIN: CPT | Mod: 95

## 2023-09-20 RX ORDER — GABAPENTIN 300 MG/1
300 CAPSULE ORAL
Qty: 30 | Refills: 0 | Status: ACTIVE | COMMUNITY
Start: 2023-09-20 | End: 1900-01-01

## 2023-09-21 PROBLEM — I25.2 HISTORY OF INFERIOR WALL MYOCARDIAL INFARCTION: Status: RESOLVED | Noted: 2019-11-06 | Resolved: 2023-09-21

## 2023-09-21 PROBLEM — M48.062 LUMBAR STENOSIS WITH NEUROGENIC CLAUDICATION: Status: ACTIVE | Noted: 2023-09-20

## 2023-09-21 PROBLEM — Z82.49 FAMILY HISTORY OF CORONARY ARTERY DISEASE: Status: ACTIVE | Noted: 2019-11-06

## 2023-09-23 ENCOUNTER — APPOINTMENT (OUTPATIENT)
Dept: RADIOLOGY | Facility: CLINIC | Age: 63
End: 2023-09-23
Payer: MEDICARE

## 2023-09-23 ENCOUNTER — OUTPATIENT (OUTPATIENT)
Dept: OUTPATIENT SERVICES | Facility: HOSPITAL | Age: 63
LOS: 1 days | End: 2023-09-23

## 2023-09-23 ENCOUNTER — APPOINTMENT (OUTPATIENT)
Dept: MRI IMAGING | Facility: CLINIC | Age: 63
End: 2023-09-23
Payer: MEDICARE

## 2023-09-23 PROCEDURE — 72052 X-RAY EXAM NECK SPINE 6/>VWS: CPT | Mod: 26

## 2023-09-23 PROCEDURE — 72148 MRI LUMBAR SPINE W/O DYE: CPT | Mod: 26,MH

## 2023-09-23 PROCEDURE — 72141 MRI NECK SPINE W/O DYE: CPT | Mod: 26,MH

## 2023-09-23 PROCEDURE — 73030 X-RAY EXAM OF SHOULDER: CPT | Mod: 26,RT

## 2023-09-23 PROCEDURE — 72114 X-RAY EXAM L-S SPINE BENDING: CPT | Mod: 26

## 2023-09-28 ENCOUNTER — NON-APPOINTMENT (OUTPATIENT)
Age: 63
End: 2023-09-28

## 2023-09-28 ENCOUNTER — APPOINTMENT (OUTPATIENT)
Dept: SPINE | Facility: CLINIC | Age: 63
End: 2023-09-28
Payer: MEDICARE

## 2023-09-28 VITALS
TEMPERATURE: 98.2 F | WEIGHT: 189 LBS | RESPIRATION RATE: 18 BRPM | HEIGHT: 70 IN | DIASTOLIC BLOOD PRESSURE: 85 MMHG | SYSTOLIC BLOOD PRESSURE: 131 MMHG | HEART RATE: 86 BPM | OXYGEN SATURATION: 97 % | BODY MASS INDEX: 27.06 KG/M2

## 2023-09-28 DIAGNOSIS — M51.36 OTHER INTERVERTEBRAL DISC DEGENERATION, LUMBAR REGION: ICD-10-CM

## 2023-09-28 PROCEDURE — 99205 OFFICE O/P NEW HI 60 MIN: CPT

## 2023-11-16 ENCOUNTER — RX RENEWAL (OUTPATIENT)
Age: 63
End: 2023-11-16

## 2023-11-21 ENCOUNTER — APPOINTMENT (OUTPATIENT)
Dept: HEART AND VASCULAR | Facility: CLINIC | Age: 63
End: 2023-11-21
Payer: MEDICARE

## 2023-11-21 VITALS
SYSTOLIC BLOOD PRESSURE: 127 MMHG | BODY MASS INDEX: 26.81 KG/M2 | HEIGHT: 70 IN | DIASTOLIC BLOOD PRESSURE: 78 MMHG | HEART RATE: 81 BPM | TEMPERATURE: 97 F | WEIGHT: 187.25 LBS | OXYGEN SATURATION: 96 %

## 2023-11-21 VITALS
HEART RATE: 108 BPM | BODY MASS INDEX: 26.81 KG/M2 | OXYGEN SATURATION: 96 % | SYSTOLIC BLOOD PRESSURE: 153 MMHG | DIASTOLIC BLOOD PRESSURE: 100 MMHG | WEIGHT: 187.25 LBS | HEIGHT: 70 IN

## 2023-11-21 DIAGNOSIS — E78.5 HYPERLIPIDEMIA, UNSPECIFIED: ICD-10-CM

## 2023-11-21 DIAGNOSIS — I65.29 OCCLUSION AND STENOSIS OF UNSPECIFIED CAROTID ARTERY: ICD-10-CM

## 2023-11-21 DIAGNOSIS — I10 ESSENTIAL (PRIMARY) HYPERTENSION: ICD-10-CM

## 2023-11-21 DIAGNOSIS — R55 SYNCOPE AND COLLAPSE: ICD-10-CM

## 2023-11-21 DIAGNOSIS — I25.10 ATHEROSCLEROTIC HEART DISEASE OF NATIVE CORONARY ARTERY W/OUT ANGINA PECTORIS: ICD-10-CM

## 2023-11-21 PROCEDURE — 93351 STRESS TTE COMPLETE: CPT | Mod: 59

## 2023-11-21 PROCEDURE — 99214 OFFICE O/P EST MOD 30 MIN: CPT | Mod: 25

## 2023-11-21 RX ORDER — ASPIRIN ENTERIC COATED TABLETS 81 MG 81 MG/1
81 TABLET, DELAYED RELEASE ORAL
Qty: 90 | Refills: 3 | Status: ACTIVE | COMMUNITY
Start: 1900-01-01 | End: 1900-01-01

## 2023-11-21 RX ORDER — ROSUVASTATIN CALCIUM 20 MG/1
20 TABLET, FILM COATED ORAL
Qty: 90 | Refills: 3 | Status: ACTIVE | COMMUNITY
Start: 2018-09-04 | End: 1900-01-01

## 2023-11-21 RX ORDER — VALSARTAN 80 MG/1
80 TABLET, COATED ORAL
Qty: 90 | Refills: 3 | Status: ACTIVE | COMMUNITY
Start: 2021-04-19 | End: 1900-01-01

## 2024-11-12 ENCOUNTER — NON-APPOINTMENT (OUTPATIENT)
Age: 64
End: 2024-11-12

## 2024-11-12 ENCOUNTER — APPOINTMENT (OUTPATIENT)
Dept: HEART AND VASCULAR | Facility: CLINIC | Age: 64
End: 2024-11-12

## 2024-11-12 VITALS
HEART RATE: 77 BPM | OXYGEN SATURATION: 97 % | HEIGHT: 70 IN | WEIGHT: 193.56 LBS | BODY MASS INDEX: 27.71 KG/M2 | DIASTOLIC BLOOD PRESSURE: 91 MMHG | SYSTOLIC BLOOD PRESSURE: 164 MMHG | TEMPERATURE: 97.9 F

## 2024-11-12 VITALS — SYSTOLIC BLOOD PRESSURE: 146 MMHG | DIASTOLIC BLOOD PRESSURE: 95 MMHG

## 2024-11-12 DIAGNOSIS — E78.5 HYPERLIPIDEMIA, UNSPECIFIED: ICD-10-CM

## 2024-11-12 DIAGNOSIS — I10 ESSENTIAL (PRIMARY) HYPERTENSION: ICD-10-CM

## 2024-11-12 DIAGNOSIS — I25.10 ATHEROSCLEROTIC HEART DISEASE OF NATIVE CORONARY ARTERY W/OUT ANGINA PECTORIS: ICD-10-CM

## 2024-11-12 DIAGNOSIS — R55 SYNCOPE AND COLLAPSE: ICD-10-CM

## 2024-11-12 DIAGNOSIS — I65.29 OCCLUSION AND STENOSIS OF UNSPECIFIED CAROTID ARTERY: ICD-10-CM

## 2024-11-12 PROCEDURE — 99214 OFFICE O/P EST MOD 30 MIN: CPT

## 2024-11-12 PROCEDURE — 93000 ELECTROCARDIOGRAM COMPLETE: CPT

## 2024-11-13 LAB
ALBUMIN SERPL ELPH-MCNC: 4.5 G/DL
ALP BLD-CCNC: 87 U/L
ALT SERPL-CCNC: 40 U/L
ANION GAP SERPL CALC-SCNC: 14 MMOL/L
AST SERPL-CCNC: 20 U/L
BASOPHILS # BLD AUTO: 0.03 K/UL
BASOPHILS NFR BLD AUTO: 0.5 %
BILIRUB SERPL-MCNC: 1 MG/DL
BUN SERPL-MCNC: 16 MG/DL
CALCIUM SERPL-MCNC: 9.2 MG/DL
CHLORIDE SERPL-SCNC: 104 MMOL/L
CHOLEST SERPL-MCNC: 141 MG/DL
CO2 SERPL-SCNC: 23 MMOL/L
CREAT SERPL-MCNC: 0.85 MG/DL
EGFR: 98 ML/MIN/1.73M2
EOSINOPHIL # BLD AUTO: 0.08 K/UL
EOSINOPHIL NFR BLD AUTO: 1.3 %
ESTIMATED AVERAGE GLUCOSE: 103 MG/DL
GLUCOSE SERPL-MCNC: 106 MG/DL
HBA1C MFR BLD HPLC: 5.2 %
HCT VFR BLD CALC: 45.7 %
HDLC SERPL-MCNC: 62 MG/DL
HGB BLD-MCNC: 14.8 G/DL
IMM GRANULOCYTES NFR BLD AUTO: 0.3 %
LDLC SERPL DIRECT ASSAY-MCNC: 43 MG/DL
LYMPHOCYTES # BLD AUTO: 1.87 K/UL
LYMPHOCYTES NFR BLD AUTO: 31.3 %
MAN DIFF?: NORMAL
MCHC RBC-ENTMCNC: 29.4 PG
MCHC RBC-ENTMCNC: 32.4 G/DL
MCV RBC AUTO: 90.7 FL
MONOCYTES # BLD AUTO: 0.51 K/UL
MONOCYTES NFR BLD AUTO: 8.5 %
NEUTROPHILS # BLD AUTO: 3.47 K/UL
NEUTROPHILS NFR BLD AUTO: 58.1 %
PLATELET # BLD AUTO: 212 K/UL
POTASSIUM SERPL-SCNC: 4.1 MMOL/L
PROT SERPL-MCNC: 7.5 G/DL
RBC # BLD: 5.04 M/UL
RBC # FLD: 14.6 %
SODIUM SERPL-SCNC: 142 MMOL/L
TRIGL SERPL-MCNC: 204 MG/DL
TSH SERPL-ACNC: 2.17 UIU/ML
WBC # FLD AUTO: 5.98 K/UL

## 2024-11-14 LAB — APO LP(A) SERPL-MCNC: <9 NMOL/L
